# Patient Record
Sex: MALE | Race: WHITE | Employment: OTHER | URBAN - METROPOLITAN AREA
[De-identification: names, ages, dates, MRNs, and addresses within clinical notes are randomized per-mention and may not be internally consistent; named-entity substitution may affect disease eponyms.]

---

## 2022-12-15 PROBLEM — R97.20 ELEVATED PSA: Status: ACTIVE | Noted: 2022-12-15

## 2022-12-30 ENCOUNTER — HOSPITAL ENCOUNTER (OUTPATIENT)
Dept: MRI IMAGING | Age: 67
End: 2022-12-30
Payer: MEDICARE

## 2022-12-30 DIAGNOSIS — R97.20 ELEVATED PSA: ICD-10-CM

## 2022-12-30 LAB
EGFR, POC: >60 ML/MIN/1.73M2
POC CREATININE: 0.73 MG/DL (ref 0.51–1.19)

## 2022-12-30 PROCEDURE — A9579 GAD-BASE MR CONTRAST NOS,1ML: HCPCS | Performed by: SPECIALIST

## 2022-12-30 PROCEDURE — 2580000003 HC RX 258: Performed by: SPECIALIST

## 2022-12-30 PROCEDURE — 82565 ASSAY OF CREATININE: CPT

## 2022-12-30 PROCEDURE — 6360000004 HC RX CONTRAST MEDICATION: Performed by: SPECIALIST

## 2022-12-30 PROCEDURE — 72197 MRI PELVIS W/O & W/DYE: CPT

## 2022-12-30 RX ORDER — SODIUM CHLORIDE 0.9 % (FLUSH) 0.9 %
10 SYRINGE (ML) INJECTION PRN
Status: DISCONTINUED | OUTPATIENT
Start: 2022-12-30 | End: 2023-01-02 | Stop reason: HOSPADM

## 2022-12-30 RX ORDER — 0.9 % SODIUM CHLORIDE 0.9 %
30 INTRAVENOUS SOLUTION INTRAVENOUS ONCE
Status: COMPLETED | OUTPATIENT
Start: 2022-12-30 | End: 2022-12-30

## 2022-12-30 RX ADMIN — SODIUM CHLORIDE 30 ML: 9 INJECTION, SOLUTION INTRAVENOUS at 09:51

## 2022-12-30 RX ADMIN — SODIUM CHLORIDE, PRESERVATIVE FREE 10 ML: 5 INJECTION INTRAVENOUS at 09:50

## 2022-12-30 RX ADMIN — GADOTERIDOL 20 ML: 279.3 INJECTION, SOLUTION INTRAVENOUS at 09:49

## 2023-01-31 ENCOUNTER — HOSPITAL ENCOUNTER (OUTPATIENT)
Dept: ULTRASOUND IMAGING | Age: 68
Setting detail: OUTPATIENT SURGERY
Discharge: HOME | End: 2023-02-02
Attending: SPECIALIST
Payer: MEDICARE

## 2023-01-31 ENCOUNTER — ANESTHESIA (OUTPATIENT)
Dept: OPERATING ROOM | Age: 68
End: 2023-01-31
Payer: MEDICARE

## 2023-01-31 ENCOUNTER — ANESTHESIA EVENT (OUTPATIENT)
Dept: OPERATING ROOM | Age: 68
End: 2023-01-31
Payer: MEDICARE

## 2023-01-31 ENCOUNTER — HOSPITAL ENCOUNTER (OUTPATIENT)
Age: 68
Setting detail: OUTPATIENT SURGERY
Discharge: HOME | End: 2023-01-31
Attending: SPECIALIST | Admitting: SPECIALIST
Payer: MEDICARE

## 2023-01-31 VITALS
TEMPERATURE: 97.2 F | DIASTOLIC BLOOD PRESSURE: 75 MMHG | OXYGEN SATURATION: 97 % | HEART RATE: 65 BPM | WEIGHT: 230 LBS | BODY MASS INDEX: 28.6 KG/M2 | SYSTOLIC BLOOD PRESSURE: 150 MMHG | RESPIRATION RATE: 14 BRPM | HEIGHT: 75 IN

## 2023-01-31 DIAGNOSIS — R97.20 ELEVATED PSA: ICD-10-CM

## 2023-01-31 PROCEDURE — 2500000003 HC RX 250 WO HCPCS: Performed by: NURSE ANESTHETIST, CERTIFIED REGISTERED

## 2023-01-31 PROCEDURE — 3700000001 HC ADD 15 MINUTES (ANESTHESIA): Performed by: SPECIALIST

## 2023-01-31 PROCEDURE — 6360000002 HC RX W HCPCS: Performed by: NURSE ANESTHETIST, CERTIFIED REGISTERED

## 2023-01-31 PROCEDURE — 88305 TISSUE EXAM BY PATHOLOGIST: CPT

## 2023-01-31 PROCEDURE — 2500000003 HC RX 250 WO HCPCS: Performed by: SPECIALIST

## 2023-01-31 PROCEDURE — 3600000012 HC SURGERY LEVEL 2 ADDTL 15MIN: Performed by: SPECIALIST

## 2023-01-31 PROCEDURE — 7100000010 HC PHASE II RECOVERY - FIRST 15 MIN: Performed by: SPECIALIST

## 2023-01-31 PROCEDURE — 88344 IMHCHEM/IMCYTCHM EA MLT ANTB: CPT

## 2023-01-31 PROCEDURE — 7100000011 HC PHASE II RECOVERY - ADDTL 15 MIN: Performed by: SPECIALIST

## 2023-01-31 PROCEDURE — 2580000003 HC RX 258: Performed by: NURSE ANESTHETIST, CERTIFIED REGISTERED

## 2023-01-31 PROCEDURE — 2709999900 HC NON-CHARGEABLE SUPPLY: Performed by: SPECIALIST

## 2023-01-31 PROCEDURE — 3700000000 HC ANESTHESIA ATTENDED CARE: Performed by: SPECIALIST

## 2023-01-31 PROCEDURE — 2709999900 US GUIDED NEEDLE PLACEMENT

## 2023-01-31 PROCEDURE — 3600000002 HC SURGERY LEVEL 2 BASE: Performed by: SPECIALIST

## 2023-01-31 RX ORDER — FENTANYL CITRATE 50 UG/ML
25 INJECTION, SOLUTION INTRAMUSCULAR; INTRAVENOUS EVERY 5 MIN PRN
Status: DISCONTINUED | OUTPATIENT
Start: 2023-01-31 | End: 2023-01-31 | Stop reason: HOSPADM

## 2023-01-31 RX ORDER — MORPHINE SULFATE 2 MG/ML
2 INJECTION, SOLUTION INTRAMUSCULAR; INTRAVENOUS EVERY 5 MIN PRN
Status: DISCONTINUED | OUTPATIENT
Start: 2023-01-31 | End: 2023-01-31 | Stop reason: HOSPADM

## 2023-01-31 RX ORDER — LIDOCAINE HYDROCHLORIDE 10 MG/ML
INJECTION, SOLUTION EPIDURAL; INFILTRATION; INTRACAUDAL; PERINEURAL PRN
Status: DISCONTINUED | OUTPATIENT
Start: 2023-01-31 | End: 2023-01-31 | Stop reason: SDUPTHER

## 2023-01-31 RX ORDER — ONDANSETRON 2 MG/ML
4 INJECTION INTRAMUSCULAR; INTRAVENOUS
Status: DISCONTINUED | OUTPATIENT
Start: 2023-01-31 | End: 2023-01-31 | Stop reason: HOSPADM

## 2023-01-31 RX ORDER — PROPOFOL 10 MG/ML
INJECTION, EMULSION INTRAVENOUS PRN
Status: DISCONTINUED | OUTPATIENT
Start: 2023-01-31 | End: 2023-01-31 | Stop reason: SDUPTHER

## 2023-01-31 RX ORDER — SODIUM CHLORIDE 0.9 % (FLUSH) 0.9 %
5-40 SYRINGE (ML) INJECTION PRN
Status: DISCONTINUED | OUTPATIENT
Start: 2023-01-31 | End: 2023-01-31 | Stop reason: HOSPADM

## 2023-01-31 RX ORDER — SODIUM CHLORIDE 9 MG/ML
INJECTION, SOLUTION INTRAVENOUS PRN
Status: DISCONTINUED | OUTPATIENT
Start: 2023-01-31 | End: 2023-01-31 | Stop reason: HOSPADM

## 2023-01-31 RX ORDER — SODIUM CHLORIDE, SODIUM LACTATE, POTASSIUM CHLORIDE, CALCIUM CHLORIDE 600; 310; 30; 20 MG/100ML; MG/100ML; MG/100ML; MG/100ML
INJECTION, SOLUTION INTRAVENOUS CONTINUOUS PRN
Status: DISCONTINUED | OUTPATIENT
Start: 2023-01-31 | End: 2023-01-31 | Stop reason: SDUPTHER

## 2023-01-31 RX ORDER — LIDOCAINE HYDROCHLORIDE 10 MG/ML
INJECTION, SOLUTION EPIDURAL; INFILTRATION; INTRACAUDAL; PERINEURAL PRN
Status: DISCONTINUED | OUTPATIENT
Start: 2023-01-31 | End: 2023-01-31 | Stop reason: HOSPADM

## 2023-01-31 RX ORDER — CEFTRIAXONE 1 G/1
INJECTION, POWDER, FOR SOLUTION INTRAMUSCULAR; INTRAVENOUS PRN
Status: DISCONTINUED | OUTPATIENT
Start: 2023-01-31 | End: 2023-01-31 | Stop reason: SDUPTHER

## 2023-01-31 RX ORDER — DIPHENHYDRAMINE HYDROCHLORIDE 50 MG/ML
12.5 INJECTION INTRAMUSCULAR; INTRAVENOUS
Status: DISCONTINUED | OUTPATIENT
Start: 2023-01-31 | End: 2023-01-31 | Stop reason: HOSPADM

## 2023-01-31 RX ORDER — SODIUM CHLORIDE 0.9 % (FLUSH) 0.9 %
5-40 SYRINGE (ML) INJECTION EVERY 12 HOURS SCHEDULED
Status: DISCONTINUED | OUTPATIENT
Start: 2023-01-31 | End: 2023-01-31 | Stop reason: HOSPADM

## 2023-01-31 RX ORDER — FENTANYL CITRATE 50 UG/ML
INJECTION, SOLUTION INTRAMUSCULAR; INTRAVENOUS PRN
Status: DISCONTINUED | OUTPATIENT
Start: 2023-01-31 | End: 2023-01-31 | Stop reason: SDUPTHER

## 2023-01-31 RX ADMIN — SODIUM CHLORIDE, POTASSIUM CHLORIDE, SODIUM LACTATE AND CALCIUM CHLORIDE: 600; 310; 30; 20 INJECTION, SOLUTION INTRAVENOUS at 08:40

## 2023-01-31 RX ADMIN — FENTANYL CITRATE 50 MCG: 50 INJECTION, SOLUTION INTRAMUSCULAR; INTRAVENOUS at 08:43

## 2023-01-31 RX ADMIN — FENTANYL CITRATE 50 MCG: 50 INJECTION, SOLUTION INTRAMUSCULAR; INTRAVENOUS at 08:56

## 2023-01-31 RX ADMIN — PROPOFOL 120 MCG/KG/MIN: 10 INJECTION, EMULSION INTRAVENOUS at 08:44

## 2023-01-31 RX ADMIN — PROPOFOL 100 MG: 10 INJECTION, EMULSION INTRAVENOUS at 08:43

## 2023-01-31 RX ADMIN — SODIUM CHLORIDE, POTASSIUM CHLORIDE, SODIUM LACTATE AND CALCIUM CHLORIDE: 600; 310; 30; 20 INJECTION, SOLUTION INTRAVENOUS at 08:21

## 2023-01-31 RX ADMIN — LIDOCAINE HYDROCHLORIDE 50 MG: 10 INJECTION, SOLUTION EPIDURAL; INFILTRATION; INTRACAUDAL; PERINEURAL at 08:43

## 2023-01-31 RX ADMIN — CEFTRIAXONE SODIUM 1 G: 1 INJECTION, POWDER, FOR SOLUTION INTRAMUSCULAR; INTRAVENOUS at 08:46

## 2023-01-31 ASSESSMENT — LIFESTYLE VARIABLES: SMOKING_STATUS: 0

## 2023-01-31 NOTE — ANESTHESIA PRE PROCEDURE
Department of Anesthesiology  Preprocedure Note       Name:  Lina Porras   Age:  76 y.o.  :  1955                                          MRN:  [id number]         Date:  2023      Surgeon: Mando Concepcion):  Branden Cazares MD    Procedure: Procedure(s):  MR FUSION PROSTATE BIOPSY, ULTRASOUND    Medications prior to admission:   Prior to Admission medications    Medication Sig Start Date End Date Taking?  Authorizing Provider   ciprofloxacin (CIPRO) 500 MG tablet Take 1 tablet by mouth 2 times daily Take first dose night prior to prostate biopsy and then twice a day thereafter until completed 23   Branden Cazares MD   cephALEXin (KEFLEX) 500 MG capsule Take 1 capsule by mouth 3 times daily Take first dose night prior to prostate biopsy and then three times a day thereafter until completed 23   Branden Cazares MD   FLUARIX QUADRIVALENT 0.5 ML injection inject 0.5 milliliter intramuscularly  Patient not taking: Reported on 2023 10/2/18   Historical Provider, MD   [address] [facility] 0.5 ML DEJAN  10/5/15   Historical Provider, MD   aspirin 81 MG tablet Take 81 mg by mouth daily  Patient not taking: Reported on 2023    Historical Provider, MD   Multiple Vitamins-Minerals (THERAPEUTIC MULTIVITAMIN-MINERALS) tablet Take 1 tablet by mouth daily    Historical Provider, MD       Current medications:    Current Facility-Administered Medications   Medication Dose Route Frequency Provider Last Rate Last Admin    ceFAZolin (ANCEF) 2000 mg in sterile water 20 mL IV syringe  2,000 mg IntraVENous On Call to [address] [facility],[address], [facility]         Facility-Administered Medications Ordered in Other Encounters   Medication Dose Route Frequency Provider Last Rate Last Admin    lactated ringers IV soln infusion   IntraVENous Continuous PRN MARY Feliz - CRNA   New Bag at 23 0041       Allergies:  No Known Allergies    Problem List:    Patient Active Problem List   Diagnosis Code    Benign prostatic hyperplasia with urinary obstruction N40.1, N13.8    Microscopic hematuria R31.29    Mixed hyperlipidemia E78.2    Anxiety state F41.1    Elevated PSA R97.20       Past Medical History:        Diagnosis Date    Allergic rhinitis     Anxiety     BPH (benign prostatic hypertrophy) with urinary obstruction     Caffeine use     3 cups coffee day    Elevated prostate specific antigen (PSA)     Hematuria     Hyperlipidemia     MALT lymphoma ([address]     Wears glasses     for reading    Wellness examination     PCP Constantine Yepez DO / Iam Arauz / last visit 2019       Past Surgical History:        Procedure Laterality Date    CYSTOSCOPY      HERNIA REPAIR         Social History:    Social History     Tobacco Use    Smoking status: Never    Smokeless tobacco: Never   Substance Use Topics    Alcohol use: Yes     Comment: social                                Counseling given: Not Answered      Vital Signs (Current):   Vitals:    01/27/23 1025 01/31/23 0745   BP:  (!) 175/94   Pulse:  78   Resp:  16   Temp:  97.7 °F (36.5 °C)   TempSrc:  Temporal   SpO2:  99%   Weight: 230 lb (104.3 kg)    Height: 6' 2.5\" (1.892 m)                                               BP Readings from Last 3 Encounters:   01/31/23 (!) 175/94   12/16/20 (!) 170/81   12/12/18 (!) 181/92       NPO Status: Time of last liquid consumption: 1900                        Time of last solid consumption: 1900                        Date of last liquid consumption: 01/30/23                        Date of last solid food consumption: 01/30/23    BMI:   Wt Readings from Last 3 Encounters:   01/27/23 230 lb (104.3 kg)   12/30/22 220 lb (99.8 kg)   12/15/22 228 lb (103.4 kg)     Body mass index is 29.14 kg/m².     CBC: No results found for: WBC, RBC, HGB, HCT, MCV, RDW, PLT    CMP:   Lab Results   Component Value Date/Time    CREATININE 0.73 12/30/2022 09:36 AM       POC Tests: No results for input(s): POCGLU, POCNA, POCK, POCCL, Maximo Wang, POCHCT in the last 72 hours. Coags: No results found for: PROTIME, INR, APTT    HCG (If Applicable): No results found for: PREGTESTUR, PREGSERUM, HCG, HCGQUANT     ABGs: No results found for: PHART, PO2ART, ENN6ZRV, GBD3LHM, BEART, L2HBJQKA     Type & Screen (If Applicable):  No results found for: LABABO, LABRH    Drug/Infectious Status (If Applicable):  No results found for: HIV, HEPCAB    COVID-19 Screening (If Applicable): No results found for: COVID19        Anesthesia Evaluation   no history of anesthetic complications:   Airway: Mallampati: II     Neck ROM: full     Dental:          Pulmonary:       (-) recent URI and not a current smoker                           Cardiovascular:  Exercise tolerance: good (>4 METS),                     Neuro/Psych:      (-) seizures and CVA           GI/Hepatic/Renal:        (-) GERD       Endo/Other:        (-) diabetes mellitus                ROS comment: Inc PSA  Hx lymphoma Abdominal:             Vascular: Other Findings:           Anesthesia Plan      MAC     ASA 2       Induction: intravenous.                             Cassie Duque MD   1/31/2023

## 2023-01-31 NOTE — H&P
History and Physical    Patient:  Anahi Mccabe  MRN: [id number]  YOB: 1955    CHIEF COMPLAINT:  Elevated PSA    HISTORY OF PRESENT ILLNESS:   The patient is a 76 y.o. male who presents with elevated PSA. Past Medical History:    Past Medical History:   Diagnosis Date    Allergic rhinitis     Anxiety     BPH (benign prostatic hypertrophy) with urinary obstruction     Caffeine use     3 cups coffee day    Elevated prostate specific antigen (PSA)     Hematuria     Hyperlipidemia     MALT lymphoma ([address]     Wears glasses     for reading    Wellness examination     PCP Alyssia Gallegos DO / Heather Fuller / last visit 2019       Past Surgical History:    Past Surgical History:   Procedure Laterality Date    CYSTOSCOPY      HERNIA REPAIR         Medications Prior to Admission:    Prior to Admission medications    Medication Sig Start Date End Date Taking? Authorizing Provider   ciprofloxacin (CIPRO) 500 MG tablet Take 1 tablet by mouth 2 times daily Take first dose night prior to prostate biopsy and then twice a day thereafter until completed 1/2/23   Mary Grace Lisa MD   cephALEXin (KEFLEX) 500 MG capsule Take 1 capsule by mouth 3 times daily Take first dose night prior to prostate biopsy and then three times a day thereafter until completed 1/2/23   Mary Grace Lisa MD   FLUARIX QUADRIVALENT 0.5 ML injection inject 0.5 milliliter intramuscularly  Patient not taking: Reported on 1/27/2023 10/2/18   Historical Provider, MD   AFLURIA PRESERVATIVE FREE 0.5 ML DEJAN  10/5/15   Historical Provider, MD   aspirin 81 MG tablet Take 81 mg by mouth daily  Patient not taking: Reported on 1/27/2023    Historical Provider, MD   Multiple Vitamins-Minerals (THERAPEUTIC MULTIVITAMIN-MINERALS) tablet Take 1 tablet by mouth daily    Historical Provider, MD       Allergies:  Patient has no known allergies.     Social History:    Social History     Socioeconomic History    Marital status:      Spouse name: Not on file    Number of children: Not on file    Years of education: Not on file    Highest education level: Not on file   Occupational History    Not on file   Tobacco Use    Smoking status: Never    Smokeless tobacco: Never   Vaping Use    Vaping Use: Never used   Substance and Sexual Activity    Alcohol use: Yes     Comment: social    Drug use: No    Sexual activity: Not on file   Other Topics Concern    Not on file   Social History Narrative    Not on file     Social Determinants of Health     Financial Resource Strain: Not on file   Food Insecurity: Not on file   Transportation Needs: Not on file   Physical Activity: Not on file   Stress: Not on file   Social Connections: Not on file   Intimate Partner Violence: Not on file   Housing Stability: Not on file       Family History:  History reviewed. No pertinent family history. REVIEW OF SYSTEMS:  Constitutional: negative  Eyes: negative  Respiratory: negative  Cardiovascular: negative  Gastrointestinal: negative  Genitourinary: see HPI  Musculoskeletal: negative  Skin: negative   Neurological: negative  Hematological/Lymphatic: negative  Psychological: negative      Physical Exam:      No data found. Constitutional: Patient in no acute distress; Neuro: alert and oriented to person place and time. Psych: Mood and affect normal.  Lungs: Clear to auscultation bilaterally. Respiratory effort normal  Cardiovascular: Normal S1 and S2. Normal peripheral pulses. Regular rate. Abdomen: Soft, non-tender, non-distended        LABS:   No results for input(s): WBC, HGB, HCT, MCV, PLT in the last 72 hours. No results for input(s): NA, K, CL, CO2, PHOS, BUN, CREATININE, CA in the last 72 hours.   Lab Results   Component Value Date    PSA 4.38 11/23/2022    PSA 3.42 11/29/2021    PSA 3.13 11/23/2020       Additional Lab/culture results:    Urinalysis: No results for input(s): COLORU, PHUR, LABCAST, WBCUA, RBCUA, MUCUS, TRICHOMONAS, YEAST, BACTERIA, CLARITYU, SPECGRAV, BISHOP, UROBILINOGEN, Jena Tomlinson in the last 72 hours. Invalid input(s): NITRATE, GLUCOSEUKETONESUAMORPHOUS     -----------------------------------------------------------------  Imaging Results:    Assessment and Plan   Impression:    76 y.o. male with elevated PSA    Plan:   OR today for MRI/ultrasound fusion prostate biopsy.     Ulisses Wallace MD  Urology Resident, PGY-2

## 2023-01-31 NOTE — ANESTHESIA POSTPROCEDURE EVALUATION
Department of Anesthesiology  Postprocedure Note    Patient: Victorina Duggan  MRN: [id number]  YOB: 1955  Date of evaluation: 1/31/2023      Procedure Summary     Date: 01/31/23 Room / Location: [facility] 02 / [address] [facility]""    Anesthesia Start: 0840 Anesthesia Stop: 9486    Procedure: MR FUSION PROSTATE BIOPSY, ULTRASOUND Diagnosis:       Elevated PSA      (ELEVATED PSA)    Surgeons: Oneil Bassett MD Responsible Provider: Jenniffer Cole MD    Anesthesia Type: MAC ASA Status: 2          Anesthesia Type: No value filed.     Carlito Phase I:      Carlito Phase II: Carlito Score: 10      Anesthesia Post Evaluation    Patient location during evaluation: PACU  Patient participation: complete - patient participated  Level of consciousness: awake  Pain score: 0  Nausea & Vomiting: no nausea  Cardiovascular status: hemodynamically stable  Respiratory status: room air

## 2023-01-31 NOTE — OP NOTE
Operative Note      Patient: Nicole Poole  YOB: 1955  MRN: [id number]    Date of Procedure: 1/31/2023    Pre-Op Diagnosis: ELEVATED PSA    Post-Op Diagnosis: Same       Procedure(s):  MR FUSION PROSTATE BIOPSY, ULTRASOUND    Surgeon(s):  Flaca Llanos MD    Assistant:   Resident: Danny Epps MD    Anesthesia: Monitor Anesthesia Care    Estimated Blood Loss (mL): Minimal    Complications: None    Specimens:   ID Type Source Tests Collected by Time Destination   A : LLB Tissue Prostate SURGICAL PATHOLOGY Flaca Llanos MD 1/31/2023 4351    B : RLA Tissue Prostate SURGICAL PATHOLOGY Flaca Llanos MD 1/31/2023 4066    C : RA Tissue Prostate SURGICAL PATHOLOGY Flaca Llanos MD 1/31/2023 3710    D : RLM Tissue Prostate SURGICAL PATHOLOGY Flaca Llanos MD 1/31/2023 3593    E : RM Tissue Prostate SURGICAL PATHOLOGY Flaca Llanos MD 1/31/2023 9479    F : RLB Tissue Prostate SURGICAL PATHOLOGY Flaca Llanos MD 1/31/2023 2194    G : RB Tissue Prostate SURGICAL PATHOLOGY Flaca Llanos MD 1/31/2023 2411    H : LA Tissue Prostate SURGICAL PATHOLOGY Flaca Llanos MD 1/31/2023 7239    I : LLA Tissue Prostate SURGICAL PATHOLOGY Flaca Llanos MD 1/31/2023 4984    J : LM Tissue Prostate SURGICAL PATHOLOGY Flaca Llanos MD 1/31/2023 0591    K : LLM Tissue Prostate SURGICAL PATHOLOGY Flaca Llanos MD 1/31/2023 8310    L : LB Tissue Prostate SURGICAL PATHOLOGY Flaca Llanos MD 1/31/2023 7596    M : LEFT MID TRANSITION ZONE Tissue Prostate SURGICAL PATHOLOGY Flaca Llanos MD 1/31/2023 8439        Implants:  * No implants in log *      Drains: * No LDAs found *      Indication: This is 76 y.o. gentleman with PSA of 4.38. Lab Results   Component Value Date    PSA 4.38 11/23/2022    PSA 3.42 11/29/2021      He is here today for biopsy of the prostate.   He had an MRI of prostate which showed PI-RADS 3 at left base/midgland anterior/posterior transition zone with the concerning lesion mapped for the fusion biopsy. Risks benefits and alternatives goals and possible complications of the procedure were explained to the patient for consent was obtained. He has elected to proceed. Procedure in Detail: Patient was brought back to the operating room table. He was laid in the left lateral position. EPC cuffs were placed on and functioning prior to induction of anesthesia. Anesthesia was inducted. A timeout performed. The ultrasound probe was placed per rectum using the [employer]emin[employer] biopsy system. The prostate was brought into view. The prostate was then mapped and fused to the MRI using the [employer]. His prostate is approximately 85 cc by ultrasound. Seminal vesicles appeared normal.      Using the fused MRI/ultrasound technology, targeted lesions were identified. 6 biopsies were taken from the target area. We then proceeded with a standard 16 core biopsy starting on the right side taking biopsies from the medial and lateral aspects of the base, mid and apical gland and then on the left side. These core specimens were sent off for permanent pathology. After completion of the biopsy we then removed the ultrasound probe. There is no evidence of brisk bleeding per the rectum. We used 1 percent lidocaine to inject around the neurovascular bundle bilaterally. The patient tolerated the procedure well. His anesthesia was reversed. He was then taken to recovery in stable condition. He was discharged home in stable condition and instructed to follow-up for review of his pathology results. He is instructed to call if he has any fevers shaking chills. Dr. Brian Alvarez was present for the entirety of the procedure. Disposition Follow-up: Follow up depending on results of prostate biopsy pathology.           Electronically signed by Tiffanie Maritn MD on 1/31/2023 at 9:23 AM  I was present and scrubbed for the critical portions or entire case.   Electronically signed by Lelo Ruiz MD on 1/31/2023 at 9:25 AM

## 2023-02-03 LAB — SURGICAL PATHOLOGY REPORT: NORMAL

## 2023-07-17 LAB — PROSTATE SPECIFIC ANTIGEN: 4.5 NG/ML

## 2023-08-17 ENCOUNTER — OFFICE VISIT (OUTPATIENT)
Age: 68
End: 2023-08-17
Payer: MEDICARE

## 2023-08-17 DIAGNOSIS — R31.29 MICROSCOPIC HEMATURIA: ICD-10-CM

## 2023-08-17 DIAGNOSIS — N13.8 BENIGN PROSTATIC HYPERPLASIA WITH URINARY OBSTRUCTION: ICD-10-CM

## 2023-08-17 DIAGNOSIS — N40.1 BENIGN PROSTATIC HYPERPLASIA WITH URINARY OBSTRUCTION: ICD-10-CM

## 2023-08-17 DIAGNOSIS — R97.20 ELEVATED PSA: Primary | ICD-10-CM

## 2023-08-17 LAB
BILIRUBIN, POC: NORMAL
BLOOD URINE, POC: NORMAL
CLARITY, POC: CLEAR
COLOR, POC: YELLOW
GLUCOSE URINE, POC: NORMAL
KETONES, POC: NORMAL
LEUKOCYTE EST, POC: NORMAL
NITRITE, POC: NORMAL
PH, POC: NORMAL
PROTEIN, POC: NORMAL
SPECIFIC GRAVITY, POC: NORMAL
UROBILINOGEN, POC: NORMAL

## 2023-08-17 PROCEDURE — 1123F ACP DISCUSS/DSCN MKR DOCD: CPT | Performed by: SPECIALIST

## 2023-08-17 PROCEDURE — 99214 OFFICE O/P EST MOD 30 MIN: CPT | Performed by: SPECIALIST

## 2023-08-17 PROCEDURE — 81003 URINALYSIS AUTO W/O SCOPE: CPT | Performed by: SPECIALIST

## 2023-08-17 NOTE — PROGRESS NOTES
[facility], [address] [place] [address] Urology Office Progress Note    Patient:  Tony Mohr  YOB: 1955  Date: 8/17/2023    HISTORY OF PRESENT ILLNESS:   The patient is a 76 y.o. male  Stable elevated PSA with previous negative 1/31/23 Prostate U/S and MR fusion guided biopsy of prostate; no re-biopsy required and will repeat a free and total PSA in 6 months. BPH symptoms are mild and not bothersome and thus medical therapy not indicated. Stable microhematuria on today's UA with negative workup in the past.  Thus, no further workup required.       Lower urinary tract symptoms: nocturia, 1 times per night   Last AUA Symptom Score (QOL): 1 (1)  Today's AUA Symptom Score (QOL): 1 (0)    Summary of old records:   Microhematuria: CT neg, cysto 1/9/12 neg and 12/23/11 FISH neg  BPH w/o obstruction  Elevated PSA velocity with 2.08 6/30/14 to 3.18 6/16/15 (12/30/09 PSA 2.90) but back to baseline 12/17/15 at 2.22 (27.5%)  Elevated PSA of 4.38 on 11/23/22; 1/2/23 prostate MRI (81.8 mL; 1.7 cm L PIRADS 3 lesion);  1/31/23 MR fusion biopsy neg  History of lymphoma     Additional History: none    Procedures Today: N/A    Urinalysis today:  Results for POC orders placed in visit on 08/17/23   POCT Urinalysis No Micro (Auto)   Result Value Ref Range    Color, UA yellow     Clarity, UA clear     Glucose, UA POC neg     Bilirubin, UA      Ketones, UA      Spec Grav, UA      Blood, UA POC small     pH, UA      Protein, UA POC trace     Urobilinogen, UA      Leukocytes, UA neg     Nitrite, UA neg        Last several PSA's:  Lab Results   Component Value Date    PSA 4.50 (H) 07/17/2023    PSA 4.38 11/23/2022    PSA 3.42 11/29/2021       Last total testosterone:  No results found for: TESTOSTERONE    Last BUN and creatinine:  No results found for: BUN  Lab Results   Component Value Date    CREATININE 0.73 12/30/2022       Last CBC:  No results found for: WBC, HGB, HCT, MCV, PLT    Additional Lab/Culture

## 2024-01-31 LAB — PROSTATE SPECIFIC ANTIGEN: 5.13 NG/ML

## 2024-02-15 ENCOUNTER — OFFICE VISIT (OUTPATIENT)
Age: 69
End: 2024-02-15
Payer: MEDICARE

## 2024-02-15 DIAGNOSIS — R31.29 MICROSCOPIC HEMATURIA: ICD-10-CM

## 2024-02-15 DIAGNOSIS — N13.8 BENIGN PROSTATIC HYPERPLASIA WITH URINARY OBSTRUCTION: ICD-10-CM

## 2024-02-15 DIAGNOSIS — N40.1 BENIGN PROSTATIC HYPERPLASIA WITH URINARY OBSTRUCTION: ICD-10-CM

## 2024-02-15 DIAGNOSIS — R97.20 ELEVATED PSA: Primary | ICD-10-CM

## 2024-02-15 PROCEDURE — 1123F ACP DISCUSS/DSCN MKR DOCD: CPT | Performed by: SPECIALIST

## 2024-02-15 PROCEDURE — 81003 URINALYSIS AUTO W/O SCOPE: CPT | Performed by: SPECIALIST

## 2024-02-15 PROCEDURE — 99213 OFFICE O/P EST LOW 20 MIN: CPT | Performed by: SPECIALIST

## 2024-02-15 NOTE — PROGRESS NOTES
"Cherie Hinton presents with functional mobility impairments which indicate the need for skilled intervention. Tolerating session today without incident. Pt appropriate to safely return home with Home Health Services upon discharge. Will continue to follow and progress as tolerated.      Plan/Recommendations:   Low Intensity Therapy recommended post-acute care - This is recommended as therapy feels this patient would require 2-3 visits per week. OP or HH would be the best option depending on patient's home bound status. Consider, if the patient has other  \"skilled\" needs such as wounds, IV antibiotics, etc. Combined with \"low intensity\" could also equate to a SNF. If patient is medically complex, consider LTAC.. Pt requires no DME at discharge.      Pt desires Home with Home Health at discharge. Pt cooperative; agreeable to therapeutic recommendations and plan of care.   " none    Imaging Reviewed during this Office Visit: none  (results were independently reviewed by physician and radiology report verified)    Physical Exam:    There were no vitals filed for this visit.  There is no height or weight on file to calculate BMI.  Patient is a 69 y.o. male in no acute distress and alert and oriented to person, place and time.    Assessment and Plan      1. Elevated PSA    2. Benign prostatic hyperplasia with urinary obstruction    3. Microscopic hematuria           Plan:      Return in about 6 months (around 8/15/2024) for Free/Total PSA.  Patient's PSA has gone up slightly but his PSA density is only 0.06 and is not worrisome for the size of prostate he has.  BPH symptoms are mild and not bothersome and thus medical therapy not indicated.   Stable trace microhematuria on today's UA with negative workup in the past.  Thus, no further workup required.         Daniel Minor MD [facility]  2/15/2024 8:59 AM    2024 Quality Measure Documentation: Patient has an advance care plan and/or living will and the durable power of  (POA) is lorena Celeste.    Social History     Tobacco Use   Smoking Status Never   Smokeless Tobacco Never     (If patient a smoker, smoking cessation counseling offered)

## 2024-08-08 LAB
PROSTATE SPECIFIC ANTIGEN FREE: 1.19 NG/ML
PROSTATE SPECIFIC ANTIGEN FREE: 1.19 NG/ML
PROSTATE SPECIFIC ANTIGEN PERCENT FREE: 29 %
PROSTATE SPECIFIC ANTIGEN PERCENT FREE: 29 %
PROSTATE SPECIFIC ANTIGEN: 4.13 NG/ML
PROSTATE SPECIFIC ANTIGEN: 4.13 NG/ML

## 2024-08-14 DIAGNOSIS — R97.20 ELEVATED PSA: ICD-10-CM

## 2024-08-15 ENCOUNTER — OFFICE VISIT (OUTPATIENT)
Age: 69
End: 2024-08-15
Payer: MEDICARE

## 2024-08-15 VITALS — WEIGHT: 230 LBS | BODY MASS INDEX: 29.52 KG/M2 | HEIGHT: 74 IN

## 2024-08-15 DIAGNOSIS — R31.29 MICROSCOPIC HEMATURIA: ICD-10-CM

## 2024-08-15 DIAGNOSIS — N13.8 BENIGN PROSTATIC HYPERPLASIA WITH URINARY OBSTRUCTION: ICD-10-CM

## 2024-08-15 DIAGNOSIS — R97.20 ELEVATED PSA: Primary | ICD-10-CM

## 2024-08-15 DIAGNOSIS — N40.1 BENIGN PROSTATIC HYPERPLASIA WITH URINARY OBSTRUCTION: ICD-10-CM

## 2024-08-15 PROCEDURE — 1123F ACP DISCUSS/DSCN MKR DOCD: CPT | Performed by: SPECIALIST

## 2024-08-15 PROCEDURE — 81003 URINALYSIS AUTO W/O SCOPE: CPT | Performed by: SPECIALIST

## 2024-08-15 PROCEDURE — 99213 OFFICE O/P EST LOW 20 MIN: CPT | Performed by: SPECIALIST

## 2024-08-15 NOTE — PROGRESS NOTES
Daniel Minor MD FACS    [facility] Urology Office Progress Note    Patient:  Mello Evangelista  YOB: 1955  Date: 8/15/2024    HISTORY OF PRESENT ILLNESS:   The patient is a 69 y.o. male  Stable elevated PSA with normal free PSA%; no biopsy required.   Will repeat a free and total PSA in 1 year.  BPH symptoms are mild and not bothersome and thus medical therapy not indicated.   Stable microhematuria on today's UA with negative workup in the past.  Thus, no further workup required.     Lower urinary tract symptoms: nocturia, 1 times per night   Last AUA Symptom Score (QOL): 2 (2)  Today's AUA Symptom Score (QOL): 1 (1)    Summary of old records:   Microhematuria: CT neg, cysto 1/9/12 neg and 12/23/11 FISH neg  BPH w/o obstruction  Elevated PSA velocity with 2.08 6/30/14 to 3.18 6/16/15 (12/30/09 PSA 2.90) but back to baseline 12/17/15 at 2.22 (27.5%)  Elevated PSA of 4.38 on 11/23/22; 1/2/23 prostate MRI (81.8 mL; 1.7 cm L PIRADS 3 lesion);  1/31/23 MR fusion biopsy neg  History of lymphoma    Additional History: none    Procedures Today: N/A    Urinalysis today:  Results for POC orders placed in visit on 08/15/24   POCT Urinalysis No Micro (Auto)   Result Value Ref Range    Color, UA yellow     Clarity, UA clear     Glucose, UA POC neg     Bilirubin, UA      Ketones, UA      Spec Grav, UA      Blood, UA POC trace     pH, UA      Protein, UA POC neg     Urobilinogen, UA      Leukocytes, UA neg     Nitrite, UA neg        Last several PSA's:  Lab Results   Component Value Date    PSA 4.134 08/08/2024    PSA 5.13 (H) 01/31/2024    PSA 4.50 (H) 07/17/2023       Last total testosterone:  No results found for: \"TESTOSTERONE\"    Last BUN and creatinine:  No results found for: \"BUN\"  Lab Results   Component Value Date    CREATININE 0.73 12/30/2022       Last CBC:  No results found for: \"WBC\", \"HGB\", \"HCT\", \"MCV\", \"PLT\"    Additional Lab/Culture results: none    Imaging Reviewed during this

## 2024-12-02 ENCOUNTER — TELEPHONE (OUTPATIENT)
Age: 69
End: 2024-12-02

## 2024-12-02 NOTE — TELEPHONE ENCOUNTER
Pt was one of your  patients pre covid hasn't been here since and would like to be your pt again and he  wants to come see you regarding pain in right  knee he thinks he tore hs mensicus  please advise    [phone]

## 2024-12-04 ENCOUNTER — OFFICE VISIT (OUTPATIENT)
Age: 69
End: 2024-12-04

## 2024-12-04 VITALS
DIASTOLIC BLOOD PRESSURE: 80 MMHG | OXYGEN SATURATION: 99 % | TEMPERATURE: 98.8 F | WEIGHT: 259 LBS | SYSTOLIC BLOOD PRESSURE: 158 MMHG | HEART RATE: 76 BPM | BODY MASS INDEX: 33.25 KG/M2

## 2024-12-04 DIAGNOSIS — M25.561 RIGHT KNEE PAIN, UNSPECIFIED CHRONICITY: ICD-10-CM

## 2024-12-04 DIAGNOSIS — I38 VALVULAR HEART DISEASE: ICD-10-CM

## 2024-12-04 DIAGNOSIS — I10 PRIMARY HYPERTENSION: ICD-10-CM

## 2024-12-04 DIAGNOSIS — E78.2 MIXED HYPERLIPIDEMIA: ICD-10-CM

## 2024-12-04 DIAGNOSIS — R73.01 IFG (IMPAIRED FASTING GLUCOSE): ICD-10-CM

## 2024-12-04 DIAGNOSIS — Z85.72 HISTORY OF LYMPHOMA: Primary | ICD-10-CM

## 2024-12-04 RX ORDER — LOSARTAN POTASSIUM 25 MG/1
25 TABLET ORAL DAILY
Qty: 30 TABLET | Refills: 5 | Status: SHIPPED | OUTPATIENT
Start: 2024-12-04

## 2024-12-04 SDOH — ECONOMIC STABILITY: FOOD INSECURITY: WITHIN THE PAST 12 MONTHS, THE FOOD YOU BOUGHT JUST DIDN'T LAST AND YOU DIDN'T HAVE MONEY TO GET MORE.: NEVER TRUE

## 2024-12-04 SDOH — ECONOMIC STABILITY: FOOD INSECURITY: WITHIN THE PAST 12 MONTHS, YOU WORRIED THAT YOUR FOOD WOULD RUN OUT BEFORE YOU GOT MONEY TO BUY MORE.: NEVER TRUE

## 2024-12-04 SDOH — ECONOMIC STABILITY: INCOME INSECURITY: HOW HARD IS IT FOR YOU TO PAY FOR THE VERY BASICS LIKE FOOD, HOUSING, MEDICAL CARE, AND HEATING?: NOT HARD AT ALL

## 2024-12-04 ASSESSMENT — PATIENT HEALTH QUESTIONNAIRE - PHQ9
1. LITTLE INTEREST OR PLEASURE IN DOING THINGS: NOT AT ALL
SUM OF ALL RESPONSES TO PHQ QUESTIONS 1-9: 0
SUM OF ALL RESPONSES TO PHQ QUESTIONS 1-9: 0
SUM OF ALL RESPONSES TO PHQ9 QUESTIONS 1 & 2: 0
2. FEELING DOWN, DEPRESSED OR HOPELESS: NOT AT ALL
SUM OF ALL RESPONSES TO PHQ QUESTIONS 1-9: 0
SUM OF ALL RESPONSES TO PHQ QUESTIONS 1-9: 0

## 2024-12-04 NOTE — PROGRESS NOTES
[facility]  [facility]  [address]  [facility] [address]  Dept: [phone]     Date of Visit:  2024  Patient Name: Mello Evangelista   Patient :  1955     CHIEF COMPLAINT/HPI:     Chief Complaint   Patient presents with    Knee Pain     Right knee pain. A month and a half ago possible injury to the meniscus put all weight on right foot and turned but foot didn't. Has been taking Advil and icing just wondering if he needs to do more or if it will go away on own. Has a little list of Blood pressures he would like to discuss.     Immunizations     Wants to ask about RSV and Covid if he should get or not.         HPI      Mello Evangelista, 69 y.o. presents today for right knee pain for the last month and a half. He quickly turned his body and his right leg did not fully turn. He iced and rested. He reports his pain has reduced about 85%. He was previously using the elliptical but he stopped using this machine for the potential of injuring it more. He stopped doing weights on his lower extremities. He denies pain walking, catching/locking, and instability of his knee. He takes two Tylenol in the morning. He has not had X-rays of his knees. Prior to this incident, he denies much knee pain.     He has been exercising at the [facility]. He has been wearing compression socks and attempts to elevate his feet when watching television. He has some chronic left >R ankle swelling.      He has been tracking his blood pressures at home and reports they have been elevated. He does note increased stress recently but his high blood pressure has occurred prior to this. His BP was elevated in office at 158 over 80.     He continues to follow with Dr. Minor regarding his elevated PSA which is stable.    His father passed from a stroke. His mother passed from lung cancer at the age of 90. His sister has various health conditions. His other sister has lower extremity

## 2024-12-11 ENCOUNTER — HOSPITAL ENCOUNTER (OUTPATIENT)
Age: 69
Setting detail: THERAPIES SERIES
Discharge: HOME | End: 2024-12-11
Attending: FAMILY MEDICINE
Payer: MEDICARE

## 2024-12-11 PROCEDURE — 97161 PT EVAL LOW COMPLEX 20 MIN: CPT

## 2024-12-11 PROCEDURE — 97110 THERAPEUTIC EXERCISES: CPT

## 2024-12-11 NOTE — CONSULTS
[] Moderate Complexity [] High Complexity        Treatment Charges: Mins Units Time In/Out   [x] Evaluation       [x]  Low       []  Moderate       []  High 27 1    []  Modalities        []  Ther Exercise 10 1    []  Neuromuscular Re-ed      []  Gait Training      []  Manual Therapy      []  Ther Activities      []  Aquatics      []  Vasocompression      []  Cervical Traction      []  Other      Total Billable time 37 min 2         Time in:7:58           Time Out:8:35 am    Electronically signed by: Josh Zuniga PT        Physician Signature:________________________________Date:__________________  By signing above or cosigning this note, I have reviewed this plan of care and certify a need for medically necessary rehabilitation services.     *PLEASE SIGN ABOVE AND FAX BACK ALL PAGES*

## 2024-12-11 NOTE — FLOWSHEET NOTE
[facility] Fall Risk Assessment    Patient Name:  Mello Evangelista  : 1955    Risk Factor Scale  Score   History of Falls [] Yes  [x] No 25  0    Secondary Diagnosis [] Yes  [x] No 15  0    Ambulatory Aid [] Furniture  [] Crutches/cane/walker  [x] None/bedrest/wheelchair/nurse 30  15  0    IV/Heparin Lock [] Yes  [x] No 20  0    Gait/Transferring [] Impaired  [] Weak  [x] Normal/bedrest/immobile 20  10  0    Mental Status [] Forgets limitations  [x] Oriented to own ability 15  0       Total: 0     Based on the Assessment score: check the appropriate box.    [x]  No intervention needed   Low =   Score of 0-24    []  Use standard prevention interventions Moderate =  Score of 24-44   [] Give patient handout and discuss fall prevention strategies   [] Establish goal of education for patient/family RE: fall prevention strategies    []  Use high risk prevention interventions High = Score of 45 and higher   [] Give patient handout and discuss fall prevention strategies   [] Establish goal of education for patient/family Re: fall prevention strategies   [] Discuss lifeline / other resources    Electronically signed by:   Josh Zuniga, PT  Date: 2024

## 2024-12-16 ENCOUNTER — HOSPITAL ENCOUNTER (OUTPATIENT)
Age: 69
Setting detail: THERAPIES SERIES
Discharge: HOME | End: 2024-12-16
Attending: FAMILY MEDICINE
Payer: MEDICARE

## 2024-12-16 PROCEDURE — 97110 THERAPEUTIC EXERCISES: CPT

## 2024-12-16 NOTE — FLOWSHEET NOTE
[x] [facility]  Outpatient Rehabilitation & Therapy  [address], [place] [address]    Physical Therapy Daily Treatment Note      Date:  2024  Patient Name:  Mello Evangelista    :  1955  MRN: [id number]  Physician: Audrey Soliman DO                              Insurance: [employer]  $40 copay  Medical Diagnosis: Rt knee pain                  Rehab Codes: M25.561, R53.1, R26.2  Onset date: 24               Next Dr's appt.: 25  Visit# / total visits:   Cancels/No Shows: 0/0    Subjective:    Pain:  [] Yes  [x] No Location:  Pain Rating: (0-10 scale) 0/10  Pain altered Tx:  [] No  [] Yes  Action:  Comments:  Felt fine after the first day of PT.  Did a bathroom remodel over the weekend with 12 hr days.  Knee did not bother him although had some muscle soreness both legs.      Past Medical History:   Diagnosis Date    Allergic rhinitis     Anxiety     BPH (benign prostatic hypertrophy) with urinary obstruction     Caffeine use     3 cups coffee day    Elevated prostate specific antigen (PSA)     Hematuria     Hyperlipidemia     MALT lymphoma     Wears glasses     for reading    Wellness examination     PCP Audrey Soliman DO / [place] / last visit      Past Surgical History:   Procedure Laterality Date    CYSTOSCOPY      HERNIA REPAIR      PROSTATE BIOPSY N/A 2023    PROSTATE BIOPSY N/A 2023    MR FUSION PROSTATE BIOPSY, ULTRASOUND performed by Daniel Minor MD at [facility] OR       Objective:  Modalities:   Precautions [x] No  [] Yes:  Exercises:  Exercise Reps/ Time Weight/ Level Comments   Nustep  5' L4 Seat 12   Prone ham curls 15x       Prone hip ext 15x       Prone quad stretch 15\"x3-4                 Supine ham stretches 15\"x3-4       SLR 10x       Bridge on ball  10x    added 12/16             LAQ  15-20x  3#  added 12/16             Stand calf stretch  15\"x4       HR  15-20x    added 12/16   Step ups  15x 4\" added 12/16   3-way hip

## 2024-12-17 LAB
ALBUMIN: 4 G/DL
ALP BLD-CCNC: 73 U/L
ALT SERPL-CCNC: 30 U/L
ANION GAP SERPL CALCULATED.3IONS-SCNC: NORMAL MMOL/L
AST SERPL-CCNC: 42 U/L
BASOPHILS ABSOLUTE: 0.05 /ΜL
BASOPHILS RELATIVE PERCENT: 1 %
BILIRUB SERPL-MCNC: 0.7 MG/DL (ref 0.1–1.4)
BUN BLDV-MCNC: 19 MG/DL
CALCIUM SERPL-MCNC: 9.2 MG/DL
CHLORIDE BLD-SCNC: 105 MMOL/L
CHOLESTEROL, TOTAL: 217 MG/DL
CHOLESTEROL/HDL RATIO: 4.1
CO2: 29 MMOL/L
CREAT SERPL-MCNC: 0.69 MG/DL
EOSINOPHILS ABSOLUTE: 0.33 /ΜL
EOSINOPHILS RELATIVE PERCENT: 6.3 %
ESTIMATED AVERAGE GLUCOSE: 120
GFR, ESTIMATED: 113.7
GLUCOSE BLD-MCNC: 112 MG/DL
HBA1C MFR BLD: 5.8 %
HCT VFR BLD CALC: 41.4 % (ref 41–53)
HDLC SERPL-MCNC: 53 MG/DL (ref 35–70)
HEMOGLOBIN: 14.1 G/DL (ref 13.5–17.5)
LDL CHOLESTEROL: 142
LYMPHOCYTES ABSOLUTE: 1.1 /ΜL
LYMPHOCYTES RELATIVE PERCENT: 21.1 %
MCH RBC QN AUTO: 32.5 PG
MCHC RBC AUTO-ENTMCNC: 34.1 G/DL
MCV RBC AUTO: 95.4 FL
MONOCYTES ABSOLUTE: 0.52 /ΜL
MONOCYTES RELATIVE PERCENT: 10 %
NEUTROPHILS ABSOLUTE: 3.2 /ΜL
NEUTROPHILS RELATIVE PERCENT: 61.4 %
NONHDLC SERPL-MCNC: NORMAL MG/DL
PDW BLD-RTO: 44.8 %
PLATELET # BLD: 175 K/ΜL
PMV BLD AUTO: NORMAL FL
POTASSIUM SERPL-SCNC: 4.3 MMOL/L
RBC # BLD: 4.34 10^6/ΜL
SODIUM BLD-SCNC: 138 MMOL/L
T4 FREE: 0.85
TOTAL PROTEIN: 6.6 G/DL (ref 6.4–8.2)
TRIGL SERPL-MCNC: 108 MG/DL
TSH SERPL DL<=0.05 MIU/L-ACNC: 3.39 UIU/ML
VLDLC SERPL CALC-MCNC: 22 MG/DL
WBC # BLD: 5.21 10^3/ML

## 2024-12-18 ENCOUNTER — HOSPITAL ENCOUNTER (OUTPATIENT)
Age: 69
Setting detail: THERAPIES SERIES
Discharge: HOME | End: 2024-12-18
Attending: FAMILY MEDICINE
Payer: MEDICARE

## 2024-12-18 PROCEDURE — 97110 THERAPEUTIC EXERCISES: CPT

## 2024-12-18 NOTE — FLOWSHEET NOTE
Verbal  [] Demo  [] Written  Comprehension of Education:  [] Verbalizes understanding.  [] Demonstrates understanding.  [x] Needs Review.  [] Demonstrates/verbalizes understanding of HEP/Ed previously given.  Access Code: [email or web address]  URL: [email or web address]  Date: 12/11/2024  Prepared by: Josh Zuniga     Exercises  - Prone Knee Flexion  - 2-3 x daily - 7 x weekly - 1 sets - 10 reps  - Prone Quadriceps Stretch with Strap  - 2-3 x daily - 7 x weekly - 1 sets - 4 reps - 15\" hold  - Prone Hip Extension  - 2-3 x daily - 7 x weekly - 1 sets - 10 reps  - Supine Active Straight Leg Raise  - 2-3 x daily - 7 x weekly - 1 sets - 10 reps  - Supine Hamstring Stretch with Strap  - 2-3 x daily - 7 x weekly - 1 sets - 4 reps - 15\" hold     Plan: [x] Continue per plan of care.   [] Other:      Treatment Charges: Mins Units Time in/out   []  Modalities      [x]  Ther Exercise 28 2    []  Manual Therapy      []  Ther Activities      []  Aquatics      []  Neuromuscular      [] Vasocompression      [] Gait Training      [] Dry needling        [] 1 or 2 muscles        [] 3 or more muscles      []  Other      Total Treatment time 28 2      Time In:   0800 am           Time Out: 0828 am     Electronically signed by:  Mary Flores PTA

## 2024-12-19 DIAGNOSIS — R73.01 IFG (IMPAIRED FASTING GLUCOSE): ICD-10-CM

## 2024-12-19 DIAGNOSIS — E78.2 MIXED HYPERLIPIDEMIA: ICD-10-CM

## 2025-02-04 ENCOUNTER — TELEPHONE (OUTPATIENT)
Age: 70
End: 2025-02-04

## 2025-02-04 ENCOUNTER — OFFICE VISIT (OUTPATIENT)
Age: 70
End: 2025-02-04

## 2025-02-04 VITALS
WEIGHT: 251 LBS | HEART RATE: 92 BPM | HEIGHT: 74 IN | SYSTOLIC BLOOD PRESSURE: 158 MMHG | OXYGEN SATURATION: 96 % | TEMPERATURE: 98.1 F | DIASTOLIC BLOOD PRESSURE: 68 MMHG | BODY MASS INDEX: 32.21 KG/M2

## 2025-02-04 DIAGNOSIS — I38 VALVULAR HEART DISEASE: ICD-10-CM

## 2025-02-04 DIAGNOSIS — E78.2 MIXED HYPERLIPIDEMIA: ICD-10-CM

## 2025-02-04 DIAGNOSIS — R73.01 IFG (IMPAIRED FASTING GLUCOSE): ICD-10-CM

## 2025-02-04 DIAGNOSIS — Z12.11 SCREEN FOR COLON CANCER: ICD-10-CM

## 2025-02-04 DIAGNOSIS — I10 ESSENTIAL HYPERTENSION: Primary | ICD-10-CM

## 2025-02-04 SDOH — ECONOMIC STABILITY: FOOD INSECURITY: WITHIN THE PAST 12 MONTHS, YOU WORRIED THAT YOUR FOOD WOULD RUN OUT BEFORE YOU GOT MONEY TO BUY MORE.: NEVER TRUE

## 2025-02-04 SDOH — ECONOMIC STABILITY: FOOD INSECURITY: WITHIN THE PAST 12 MONTHS, THE FOOD YOU BOUGHT JUST DIDN'T LAST AND YOU DIDN'T HAVE MONEY TO GET MORE.: NEVER TRUE

## 2025-02-04 ASSESSMENT — PATIENT HEALTH QUESTIONNAIRE - PHQ9
SUM OF ALL RESPONSES TO PHQ QUESTIONS 1-9: 0
1. LITTLE INTEREST OR PLEASURE IN DOING THINGS: NOT AT ALL
SUM OF ALL RESPONSES TO PHQ QUESTIONS 1-9: 0
SUM OF ALL RESPONSES TO PHQ QUESTIONS 1-9: 0
SUM OF ALL RESPONSES TO PHQ9 QUESTIONS 1 & 2: 0
2. FEELING DOWN, DEPRESSED OR HOPELESS: NOT AT ALL
SUM OF ALL RESPONSES TO PHQ QUESTIONS 1-9: 0

## 2025-02-04 NOTE — PROGRESS NOTES
After obtaining consent, and per orders of Dr. whitfield, injection of prevnar-20 given in Left deltoid by Oneil Leger MA. Patient tolerated the injection well.   
175     RDW 12/17/2024 44.8     Neutrophils % 12/17/2024 61.4     Lymphocytes % 12/17/2024 21.1     Monocytes % 12/17/2024 10     Eosinophils % 12/17/2024 6.3     Basophils % 12/17/2024 1     Neutrophils Absolute 12/17/2024 3.20     Lymphocytes Absolute 12/17/2024 1.10     Monocytes Absolute 12/17/2024 0.52     Eosinophils Absolute 12/17/2024 0.33     Basophils Absolute 12/17/2024 0.05     Sodium 12/17/2024 138     Chloride 12/17/2024 105     Potassium 12/17/2024 4.3     BUN 12/17/2024 19     Creatinine 12/17/2024 0.69     Glucose 12/17/2024 112     AST 12/17/2024 42     ALT 12/17/2024 30     Calcium 12/17/2024 9.2     Total Protein 12/17/2024 6.6     CO2 12/17/2024 29     Albumin 12/17/2024 4.0     Alkaline Phosphatase 12/17/2024 73     Total Bilirubin 12/17/2024 0.7     Est, Glom Filt Rate 12/17/2024 113.7     Cholesterol, Total 12/17/2024 217     HDL 12/17/2024 53     LDL Cholesterol 12/17/2024 142     Triglycerides 12/17/2024 108     Chol/HDL Ratio 12/17/2024 4.1     VLDL 12/17/2024 22     Hemoglobin A1C 12/17/2024 5.8     Estimated Avg Glucose 12/17/2024 120     T4 Free 12/17/2024 0.85     TSH 12/17/2024 3.39    Office Visit on 08/15/2024   Component Date Value    Color, UA 08/15/2024 yellow     Clarity, UA 08/15/2024 clear     Glucose, UA POC 08/15/2024 neg     Blood, UA POC 08/15/2024 trace     Protein, UA POC 08/15/2024 neg     Leukocytes, UA 08/15/2024 neg     Nitrite, UA 08/15/2024 neg    Orders Only on 08/14/2024   Component Date Value    PSA 08/08/2024 4.134     PSA, Free 08/08/2024 1.187     PSA, Free Pct 08/08/2024 29    Orders Only on 08/08/2024   Component Date Value    PSA 08/08/2024 4.134 (H)     PSA, Free 08/08/2024 1.187     PSA, Free Pct 08/08/2024 29         ASSESSMENT/PLAN     1. Essential hypertension  Comments:  continue losartan 25mg  continue to work with diet and exercise/wt loss  2. Valvular heart disease  Comments:  mild MR/AR  3. Mixed hyperlipidemia  Comments:    Orders:  -     
abnormal lab result

## 2025-02-19 LAB — NONINV COLON CA DNA+OCC BLD SCRN STL QL: NEGATIVE

## 2025-05-08 DIAGNOSIS — I10 PRIMARY HYPERTENSION: ICD-10-CM

## 2025-05-08 RX ORDER — LOSARTAN POTASSIUM 25 MG/1
25 TABLET ORAL DAILY
Qty: 90 TABLET | Refills: 2 | Status: SHIPPED | OUTPATIENT
Start: 2025-05-08

## 2025-05-08 NOTE — TELEPHONE ENCOUNTER
Mello Evangelista is calling to request a refill on the following medication(s):    Medication Request:  Requested Prescriptions     Pending Prescriptions Disp Refills    losartan (COZAAR) 25 MG tablet 90 tablet 2     Sig: Take 1 tablet by mouth daily       Last Visit Date (If Applicable):  2/4/2025    Next Visit Date:    8/11/2025

## 2025-08-11 ENCOUNTER — OFFICE VISIT (OUTPATIENT)
Age: 70
End: 2025-08-11
Payer: MEDICARE

## 2025-08-11 DIAGNOSIS — R97.20 ELEVATED PSA: ICD-10-CM

## 2025-08-11 DIAGNOSIS — I10 ESSENTIAL HYPERTENSION: Primary | ICD-10-CM

## 2025-08-11 DIAGNOSIS — R73.01 IFG (IMPAIRED FASTING GLUCOSE): ICD-10-CM

## 2025-08-11 DIAGNOSIS — Z00.00 MEDICARE ANNUAL WELLNESS VISIT, SUBSEQUENT: ICD-10-CM

## 2025-08-11 DIAGNOSIS — I38 VALVULAR HEART DISEASE: ICD-10-CM

## 2025-08-11 DIAGNOSIS — E78.2 MIXED HYPERLIPIDEMIA: ICD-10-CM

## 2025-08-11 PROCEDURE — 3078F DIAST BP <80 MM HG: CPT | Performed by: FAMILY MEDICINE

## 2025-08-11 PROCEDURE — 1123F ACP DISCUSS/DSCN MKR DOCD: CPT | Performed by: FAMILY MEDICINE

## 2025-08-11 PROCEDURE — 1160F RVW MEDS BY RX/DR IN RCRD: CPT | Performed by: FAMILY MEDICINE

## 2025-08-11 PROCEDURE — G0439 PPPS, SUBSEQ VISIT: HCPCS | Performed by: FAMILY MEDICINE

## 2025-08-11 PROCEDURE — 99214 OFFICE O/P EST MOD 30 MIN: CPT | Performed by: FAMILY MEDICINE

## 2025-08-11 PROCEDURE — 3075F SYST BP GE 130 - 139MM HG: CPT | Performed by: FAMILY MEDICINE

## 2025-08-11 PROCEDURE — 1159F MED LIST DOCD IN RCRD: CPT | Performed by: FAMILY MEDICINE

## 2025-08-11 RX ORDER — ROSUVASTATIN CALCIUM 5 MG/1
5 TABLET, COATED ORAL DAILY
Qty: 90 TABLET | Refills: 1 | Status: SHIPPED | OUTPATIENT
Start: 2025-08-11

## 2025-08-11 ASSESSMENT — LIFESTYLE VARIABLES
HOW MANY STANDARD DRINKS CONTAINING ALCOHOL DO YOU HAVE ON A TYPICAL DAY: 1 OR 2
HOW OFTEN DO YOU HAVE A DRINK CONTAINING ALCOHOL: MONTHLY OR LESS

## 2025-08-11 ASSESSMENT — PATIENT HEALTH QUESTIONNAIRE - PHQ9
SUM OF ALL RESPONSES TO PHQ QUESTIONS 1-9: 0
SUM OF ALL RESPONSES TO PHQ QUESTIONS 1-9: 0
2. FEELING DOWN, DEPRESSED OR HOPELESS: NOT AT ALL
SUM OF ALL RESPONSES TO PHQ QUESTIONS 1-9: 0
SUM OF ALL RESPONSES TO PHQ QUESTIONS 1-9: 0
1. LITTLE INTEREST OR PLEASURE IN DOING THINGS: NOT AT ALL

## 2025-08-12 VITALS
WEIGHT: 255 LBS | BODY MASS INDEX: 32.73 KG/M2 | DIASTOLIC BLOOD PRESSURE: 60 MMHG | HEART RATE: 75 BPM | TEMPERATURE: 98.2 F | OXYGEN SATURATION: 98 % | SYSTOLIC BLOOD PRESSURE: 132 MMHG | HEIGHT: 74 IN

## (undated) DEVICE — GLOVE ORANGE PI 7   MSG9070

## (undated) DEVICE — ADAPTER URO SCP UROLOK LL

## (undated) DEVICE — NEEDLE BX ASPIR SPNL TIPCM MRK AND NDL STP 22GAX20CM

## (undated) DEVICE — GLOVE ORANGE PI 8   MSG9080